# Patient Record
Sex: MALE | Race: AMERICAN INDIAN OR ALASKA NATIVE
[De-identification: names, ages, dates, MRNs, and addresses within clinical notes are randomized per-mention and may not be internally consistent; named-entity substitution may affect disease eponyms.]

---

## 2017-04-07 NOTE — EMERGENCY DEPARTMENT REPORT
ED Back Pain/Injury HPI





- General


Chief Complaint: Back Pain/Injury


Stated Complaint: NUMBNESS /EGS/FEET/LOWER BACK


Time Seen by Provider: 17 14:29


Source: patient


Limitations: No Limitations





- History of Present Illness


Initial Comments: 





PT c/o low back pain x 2 days.  PT states his back pain flared up after lifting 

a bag of groceries. PT states he was dx with a slipped disc 10 years ago.   PT 

states his PCP has him on Hydrocodone 10 mg every month for his pain.  PT also c

/o diabetic neuropathy pain x 5 years.  PT states he ran out of his pain 

medication 2 days ago and he has not had any to take.  


MD Complaint: back pain


Onset/Timin


-: Gradual, days(s)


Similar Symptoms Previously: Yes


Radiation: left leg, right leg


Severity scale (0 -10): 10


Consistency: constant


Improves With: none


Worsens With: movement


Associated Symptoms: numbness (to legs x 5 years - hx of dm neuropathy ).  

denies: weakness, difficulty walking, difficulty urinating, incontinence, 

abdominal pain





- Related Data


 Home Medications











 Medication  Instructions  Recorded  Confirmed  Last Taken


 


Insulin NPH, Human [NovoLIN N] 10 unit SUB-Q BID 14 Unknown


 


Insulin Regular, Human [HumuLIN R] 10 unit SUB-Q QAC 14 Unknown








 Previous Rx's











 Medication  Instructions  Recorded  Last Taken  Type


 


Ciprofloxacin HCl [Ciprofloxacin 500 mg PO Q12H #60 tab 14 Unknown Rx





TAB]    


 


Gabapentin [Neurontin] 300 mg PO Q8HR #90 capsule 14 Unknown Rx


 


Insulin NPH, Human [NovoLIN N] 17 unit SUB-Q BIDDIAB #30 ml 14 Unknown Rx


 


Insulin Regular, Human [HumuLIN R] 0 units SUB-Q ACHS #30 units 14 

Unknown Rx


 


metroNIDAZOLE [Flagyl] 500 mg PO Q8HR #90 tablet 14 Unknown Rx


 


Acetaminophen/Codeine [Tylenol #3] 1 tab PO Q6H PRN #10 tab 17 Unknown Rx


 


methOCARBAMOL [Robaxin TAB] 500 mg PO Q6H PRN #15 tablet 17 Unknown Rx











 Allergies











Allergy/AdvReac Type Severity Reaction Status Date / Time


 


No Known Allergies Allergy   Verified 02/25/14 17:15














ED Review of Systems


ROS: 


Stated complaint: NUMBNESS /EGS/FEET/LOWER BACK


Other details as noted in HPI





Comment: All other systems reviewed and negative


Constitutional: denies: chills, fever


Gastrointestinal: denies: abdominal pain


Genitourinary: denies: dysuria


Musculoskeletal: back pain (chronic - out of medication )


Neurological: numbness (to legs )





ED Past Medical Hx





- Past Medical History


Hx Congestive Heart Failure: No


Hx Diabetes: Yes (insulin-dependent)


Hx Asthma: No


Hx COPD: No


Additional medical history: L-5 DJD-chronic pain





- Surgical History


Additional Surgical History: foot surgery





- Social History


Smoking Status: Never Smoker


Substance Use Type: None





- Medications


Home Medications: 


 Home Medications











 Medication  Instructions  Recorded  Confirmed  Last Taken  Type


 


Insulin NPH, Human [NovoLIN N] 10 unit SUB-Q BID 14 Unknown 

History


 


Insulin Regular, Human [HumuLIN R] 10 unit SUB-Q QAC 14 Unknown 

History


 


Ciprofloxacin HCl [Ciprofloxacin 500 mg PO Q12H #60 tab 14  Unknown Rx





TAB]     


 


Gabapentin [Neurontin] 300 mg PO Q8HR #90 capsule 14  Unknown Rx


 


Insulin NPH, Human [NovoLIN N] 17 unit SUB-Q BIDDIAB #30 ml 14  Unknown Rx


 


Insulin Regular, Human [HumuLIN R] 0 units SUB-Q ACHS #30 units 14  

Unknown Rx


 


metroNIDAZOLE [Flagyl] 500 mg PO Q8HR #90 tablet 14  Unknown Rx


 


Acetaminophen/Codeine [Tylenol #3] 1 tab PO Q6H PRN #10 tab 17  Unknown Rx


 


methOCARBAMOL [Robaxin TAB] 500 mg PO Q6H PRN #15 tablet 17  Unknown Rx














ED Physical Exam





- General


Limitations: No Limitations


General appearance: alert, in no apparent distress





- Head


Head exam: Present: atraumatic, normocephalic





- Eye


Eye exam: Present: normal appearance.  Absent: conjunctival injection





- ENT


ENT exam: Present: normal exam, normal external ear exam





- Neck


Neck exam: Present: normal inspection, full ROM





- Respiratory


Respiratory exam: Present: normal lung sounds bilaterally.  Absent: respiratory 

distress





- Cardiovascular


Cardiovascular Exam: Present: regular rate, normal rhythm





- GI/Abdominal


GI/Abdominal exam: Present: soft.  Absent: tenderness





- Extremities Exam


Extremities exam: Present: normal inspection, normal capillary refill, other (+

2 DP jerrod, no homen's jerrod.  no ulcer or erytherma to soles of feet jerrod ).  Absent

: tenderness, pedal edema, joint swelling, calf tenderness





- Back Exam


Back exam: Present: normal inspection, tenderness, paraspinal tenderness (R 

lumbar ), vertebral tenderness (lumbar ).  Absent: CVA tenderness (R), CVA 

tenderness (L)





- Expanded Back Exam


  ** Expanded


Back exam: Absent: saddle anesthesia


Back exam: Positive Straight Leg Raise: Left, Right





- Neurological Exam


Neurological exam: Present: alert, oriented X3





- Psychiatric


Psychiatric exam: Present: normal affect, normal mood





- Skin


Skin exam: Present: warm, dry, intact, normal color





ED Course


 Vital Signs











  17





  12:51


 


Temperature 97.5 F L


 


Pulse Rate 76


 


Respiratory 18





Rate 


 


Blood Pressure 149/95


 


O2 Sat by Pulse 100





Oximetry 














- Reevaluation(s)


Reevaluation #1: 





17 16:28


PT aware of XR result.  PT aware he will need to follow up with PCP.   PT has 

no questions at this time. 





- Pulse Oximetry Interpretation


  ** Digit-Finger


Initial Pulse Oximetry Readin


Actions Taken: none





ED Medical Decision Making





- Differential Diagnosis


chronic pain, low back pain, 


Critical care attestation.: 


If time is entered above; I have spent that time in minutes in the direct care 

of this critically ill patient, excluding procedure time.








ED Disposition


Clinical Impression: 


 Acute exacerbation of chronic low back pain





Spondylosis


Qualifiers:


 Spinal region: lumbar Spinal osteoarthritis complication: unspecified spinal 

osteoarthritis Qualified Code(s): M47.816 - Spondylosis without myelopathy or 

radiculopathy, lumbar region





Disposition: DISCHARGED TO HOME OR SELFCARE


Is pt being admited?: No


Does the pt Need Aspirin: No


Condition: Stable


Instructions:  Low Back Strain (ED), Acute Low Back Pain (ED)


Additional Instructions: 


Follow up with Dr Jorge Brandon's office next week


No driving or ETOH after Robaxin or Tylenol #3 


Prescriptions: 


Acetaminophen/Codeine [Tylenol #3] 1 tab PO Q6H PRN #10 tab


 PRN Reason: Pain , Severe (7-10)


methOCARBAMOL [Robaxin TAB] 500 mg PO Q6H PRN #15 tablet


 PRN Reason: Muscle Spasm


Referrals: 


PRIMARY CARE,MD [Primary Care Provider] - 3-5 Days


JORGE BRANDON MD [Referring] - 3-5 Days


Time of Disposition: 16:31

## 2017-04-07 NOTE — XRAY REPORT
LUMBOSACRAL SPINE, 3 VIEWS:



History: Back pain



Findings: There is straightening of normal lordosis. No evidence for 

compression deformity, subluxation or bone lesion. Mild disc space 

narrowing is noted at L5-S1. There is mild diffuse facet arthropathy. 

The sacrum and SI joints are within normal limits.



Impression:

Mild lumbar spondylosis. Straightening of the normal lordosis. No acute 

process.

## 2021-07-14 NOTE — VASCULAR LAB REPORT
DUPLEX DOPPLER LOWER EXTREMITY ARTERIAL, RIGHT



INDICATION / CLINICAL INFORMATION: CHRONIC ULCER OF OTHER PART OF RIGHT FOOT.



TECHNIQUE: Arterial duplex examination of the right lower extremity performed using B-mode, color kavita
w and spectral Doppler assessment.



FINDINGS: 



RIGHT:

Common Femoral Artery:  cm/sec.  Triphasic waveform.

Proximal SFA: PSV 95 cm/sec.  Triphasic waveform.

Mid SFA: PSV 89 cm/sec.  Triphasic waveform.

Distal SFA:  cm/sec.  Triphasic waveform.

Popliteal artery:  cm/sec.  Triphasic waveform.

Posterior tibial artery: PSV 96 cm/sec.  Triphasic waveform.

Dorsalis Pedis Artery: PSV 56 cm/sec.  Monophasic waveform.



Right ADA: Not calculated.



IMPRESSION:

1. Multifocal atherosclerotic disease is present throughout the right lower extremity. Transition fro
m triphasic to monophasic waveform in the dorsalis pedis artery likely from underlying stenosis.





======================

Ankle-Brachial Index (ADA): 

======================

- Calcified arteries > 1.4 

- Normal = 0.9-1.4 

- Mild PAD = 0.7-0.89

- Moderate PAD = 0.51-0.69 

- Severe PAD < 0.5



======================

Doppler Waveform: 

======================

- Triphasic is normal. 

- Biphasic is abnormal if clear transition from triphasic signal along vascular tree.

- Monophasic is abnormal.



Scribed by: Rhonda Brar RDMS, T 

Scribed: 7/14/2021 1:38 PM 



 I have reviewed the images, agree with this report, and edited this report as needed.



Signer Name: Rambo Santamaria MD 

Signed: 7/14/2021 2:46 PM

Workstation Name: [email or web address]

## 2021-08-03 NOTE — MAGNETIC RESONANCE REPORT
MR of the right foot without contrast.



TECHNIQUE: Axial, coronal and sagittal multisequence images of the right foot performed.



COMPARISON: 2/27/2014 MRI.



FINDINGS: Focal skin wound at the plantar surface of the forefoot, along the undersurface of the thir
d metatarsal head. Extensive soft tissue edema noted about the third metatarsal head and base of the 
proximal phalanx. Tiny low signal intensity foci likely reflect gas. No definite abscess or other org
anized collection, within the limitations of this noncontrast study. Destructive changes involving th
e third metatarsal head with abnormal bone marrow edema extending to the midportion of the third meta
tarsal shaft (series 8 image 10). Dorsal subluxation of the third MTP joint. Abnormal bone marrow ashish
ma noted within the base of the third toe proximal phalanx.



No additional sites of acute osteomyelitis identified. Flexion deformity noted at the great toe IP max
int with degenerative changes of the MTP joint. Remote deformity of the second metatarsal shaft may r
eflect sequela of prior trauma or infection. Corticated erosion of the distal aspect of the second me
tatarsal head likely reflects chronic mechanical erosion. There is no abnormal bone marrow signal int
ensity in this region to suggest acute osteomyelitis.



There is denervation atrophy of the intrinsic foot musculature. Nonspecific subcutaneous edema along 
the dorsum of the midfoot and forefoot there is nonspecific though may reflect cellulitis.



IMPRESSION: 

1. Focal skin wound of the plantar forefoot with osteomyelitis centered at the third metatarsal head 
with involvement of the base of the third toe proximal phalanx.

2. Extensive subcutaneous edema noted in this region, consistent with cellulitis. There is no definit
e abscess or other organized collection, within the limitations of this noncontrast study.

3. Nonspecific subcutaneous edema of the dorsum of the midfoot and forefoot may reflect cellulitis.

4. Other incidental findings noted above.



Signer Name: Blaine Yuan MD 

Signed: 8/3/2021 4:42 PM

Workstation Name: [id number]

## 2022-05-13 ENCOUNTER — HOSPITAL ENCOUNTER (EMERGENCY)
Dept: HOSPITAL 5 - ED | Age: 57
LOS: 1 days | Discharge: LEFT BEFORE DISCHARGE | End: 2022-05-14
Payer: MEDICAID

## 2022-05-13 VITALS — DIASTOLIC BLOOD PRESSURE: 86 MMHG | SYSTOLIC BLOOD PRESSURE: 149 MMHG

## 2022-05-13 DIAGNOSIS — M54.9: Primary | ICD-10-CM

## 2022-05-13 DIAGNOSIS — Z53.21: ICD-10-CM
